# Patient Record
Sex: MALE | Race: WHITE | NOT HISPANIC OR LATINO | URBAN - METROPOLITAN AREA
[De-identification: names, ages, dates, MRNs, and addresses within clinical notes are randomized per-mention and may not be internally consistent; named-entity substitution may affect disease eponyms.]

---

## 2022-01-28 ENCOUNTER — IMPORTED ENCOUNTER (OUTPATIENT)
Dept: URBAN - METROPOLITAN AREA CLINIC 38 | Facility: CLINIC | Age: 52
End: 2022-01-28

## 2022-01-28 PROBLEM — H52.4 PRESBYOPIA: Noted: 2022-01-28

## 2022-07-02 ASSESSMENT — KERATOMETRY
OD_AXISANGLE2_DEGREES: 144
OD_AXISANGLE_DEGREES: 54
OS_K2POWER_DIOPTERS: 43.00
OS_AXISANGLE_DEGREES: 124
OS_K1POWER_DIOPTERS: 42.75
OS_AXISANGLE2_DEGREES: 34
OD_K2POWER_DIOPTERS: 43.50
OD_K1POWER_DIOPTERS: 42.50

## 2022-07-02 ASSESSMENT — TONOMETRY
OD_IOP_MMHG: 11
OS_IOP_MMHG: 14

## 2022-07-02 ASSESSMENT — VISUAL ACUITY
OD_SC: J4
OD_SC: 20/20-1
OS_SC: 20/30
OS_SC: J7

## 2023-10-26 ENCOUNTER — ESTABLISHED COMPREHENSIVE EXAM (OUTPATIENT)
Dept: URBAN - METROPOLITAN AREA CLINIC 68 | Facility: CLINIC | Age: 53
End: 2023-10-26

## 2023-10-26 DIAGNOSIS — H53.2: ICD-10-CM

## 2023-10-26 PROCEDURE — 92015 DETERMINE REFRACTIVE STATE: CPT

## 2023-10-26 PROCEDURE — 92014 COMPRE OPH EXAM EST PT 1/>: CPT

## 2023-10-26 ASSESSMENT — VISUAL ACUITY
OS_CC: J1
OS_SC: 20/40
OS_PH: 20/20
OD_CC: J1
OD_SC: 20/30

## 2023-10-26 ASSESSMENT — KERATOMETRY
OD_AXISANGLE2_DEGREES: 132
OS_AXISANGLE_DEGREES: 121
OD_K1POWER_DIOPTERS: 42.50
OD_AXISANGLE_DEGREES: 42
OD_K2POWER_DIOPTERS: 43.25
OS_K1POWER_DIOPTERS: 42.50
OS_K2POWER_DIOPTERS: 42.75
OS_AXISANGLE2_DEGREES: 31

## 2023-10-26 ASSESSMENT — TONOMETRY
OD_IOP_MMHG: 14
OS_IOP_MMHG: 13

## 2024-08-15 ENCOUNTER — APPOINTMENT (OUTPATIENT)
Dept: RADIOLOGY | Facility: CLINIC | Age: 54
End: 2024-08-15
Payer: COMMERCIAL

## 2024-08-15 ENCOUNTER — OFFICE VISIT (OUTPATIENT)
Dept: URGENT CARE | Facility: CLINIC | Age: 54
End: 2024-08-15
Payer: COMMERCIAL

## 2024-08-15 VITALS
DIASTOLIC BLOOD PRESSURE: 86 MMHG | HEART RATE: 76 BPM | OXYGEN SATURATION: 100 % | RESPIRATION RATE: 14 BRPM | SYSTOLIC BLOOD PRESSURE: 124 MMHG | TEMPERATURE: 96.4 F

## 2024-08-15 DIAGNOSIS — M10.9 ACUTE GOUT OF RIGHT ANKLE, UNSPECIFIED CAUSE: Primary | ICD-10-CM

## 2024-08-15 DIAGNOSIS — M25.571 RIGHT ANKLE PAIN, UNSPECIFIED CHRONICITY: ICD-10-CM

## 2024-08-15 PROCEDURE — 99213 OFFICE O/P EST LOW 20 MIN: CPT | Performed by: PHYSICIAN ASSISTANT

## 2024-08-15 PROCEDURE — 73610 X-RAY EXAM OF ANKLE: CPT

## 2024-08-15 RX ORDER — PREDNISONE 10 MG/1
40 TABLET ORAL DAILY
Qty: 16 TABLET | Refills: 0 | Status: SHIPPED | OUTPATIENT
Start: 2024-08-15 | End: 2024-08-15

## 2024-08-15 RX ORDER — PREDNISONE 10 MG/1
40 TABLET ORAL DAILY
Qty: 16 TABLET | Refills: 0 | Status: SHIPPED | OUTPATIENT
Start: 2024-08-15 | End: 2024-08-19

## 2024-08-15 RX ORDER — CETIRIZINE HYDROCHLORIDE, PSEUDOEPHEDRINE HYDROCHLORIDE 5; 120 MG/1; MG/1
1 TABLET, FILM COATED, EXTENDED RELEASE ORAL 2 TIMES DAILY
COMMUNITY

## 2024-08-15 NOTE — PATIENT INSTRUCTIONS
"Patient Education     Gout   The Basics   Written by the doctors and editors at [facility]   What is gout? -- Gout is a form of arthritis. It can cause pain and swelling in the joints.  Gout happens in people who have too much uric acid in their blood. Uric acid is a chemical that is produced when the body breaks down certain foods. Uric acid can form sharp needle-like crystals that build up in the joints and cause pain. These crystals can also form inside the tubes that carry urine from the kidneys to the bladder. There, they can turn into kidney stones that can cause pain and problems with the flow of urine.  People with gout get sudden \"flares\" or attacks of severe pain. But there are medicines that can help prevent this.  What are the symptoms of gout? -- Gout flares most often cause pain in the big toe, ankle, or knee. Often, the joint also turns red and swells. Usually, only 1 joint is affected, but some people have pain in more than 1 joint. Gout flares tend to happen more often during the night.  The pain from gout can be extreme. The pain and swelling are worst at the beginning of a flare. The symptoms then get better within a few days to weeks. It is not clear how the body \"turns off\" a gout flare.  Is there a test for gout? -- Yes. To test for gout, your doctor or nurse can take a sample of fluid from your joint that is in pain. If they find typical gout crystals in the fluid, then you have gout.  Even without checking fluid from a joint, the doctor or nurse might still strongly suspect gout if:   You have had pain and swelling in 1 joint, especially the joint at the base of the big toe.   Your symptoms completely go away between flares, at least when you first start having them.   Your blood tests show high levels of uric acid.  How is gout treated? -- There are a few medicines that can reduce the pain and swelling caused by gout. When you find one that works for you, have it close by all of the time. That " way, you can take it as soon you feel a flare starting. Gout medicines work best if you take them as soon as symptoms start.  The medicines used to treat gout flares include:   NSAIDs - This is a large group of medicines that includes ibuprofen (sample brand names: Advil, Motrin) and indomethacin (brand name: Indocin). NSAIDs might not be safe for people with kidney or liver disease, or for people who have bleeding problems.   Colchicine - This medicine helps with gout but it can also cause diarrhea, nausea, vomiting, and stomach pain.   Steroids - Steroid medicines can reduce swelling and pain. Steroids can be taken as pills or as shots.  Are there medicines to prevent gout flares? -- Yes, there are medicines that can reduce the chances of having future gout flares. Most people who have repeated or severe flares of gout need to take these medicines. In general, they all work by reducing the amount of uric acid in the blood.  Examples of these medicines include:   Allopurinol (brand names: Aloprim, Zyloprim)   Febuxostat (brand name: Uloric)   Probenecid  People with severe gout can also get a medicine called pegloticase (brand name: Krystexxa), which is given through a vein. This medicine can cause an allergic reaction in some people.  If you take a medicine to prevent gout, your doctor or nurse will want to make sure that you use it safely. They might also want to check that your uric acid level gets low enough to dissolve the gout crystals. Allopurinol, febuxostat, and probenecid can actually increase gout flares when you first start taking them. To prevent these flares, your doctor or nurse might suggest that you take low doses of colchicine when you start the medicines. This will give the gout crystals time to dissolve, and that will stop the flares over time. If you do have a gout flare, it's important to keep taking your daily medicines normally.  Your doctor will order blood tests to check your uric acid  "levels regularly. This is to make sure that the medicines are working and that you are taking the right dose. Keeping your uric acid level below a certain level can help prevent gout flares.  Can I do anything on my own to prevent gout flares? -- Yes. There are some things that might help:   It's not clear that following a specific diet plan will help with gout symptoms. But eating a balanced diet can help improve your overall health. Try to eat plenty of fruits, vegetables, whole grains, and low-fat dairy products. It's also important to drink plenty of water, and try not to get dehydrated.   Limit sugary drinks and alcohol. These can make gout flares worse.   Some people feel better when they limit foods that are high in \"purines.\" (Purines are natural substances found in many foods.) You can ask your doctor if avoiding high-purine foods might help you. But other things, like taking your medicines and avoiding alcohol, are more likely to help with gout symptoms.   If you have excess body weight, losing weight can help relieve gout.   Some people with gout also have other health problems, such as heart disease, high blood pressure, kidney disease, or obesity. If you have any of these issues, work with your doctor to manage them. This can help improve your overall health and might also help with your gout.   When you have a gout flare, you might feel better if you also rest or ice your joint.  If you are having trouble managing your gout, your doctor might refer you to a specialist called a \"rheumatologist.\"  All topics are updated as new evidence becomes available and our peer review process is complete.  This topic retrieved from [employer] on: Mar 30, 2024.  Topic 64528 Version 22.0  Release: 32.2.4 - C32.88  © 2024 UpToDate, Inc. and/or its affiliates. All rights reserved.  Consumer Information Use and Disclaimer   Disclaimer: This generalized information is a limited summary of diagnosis, treatment, and/or " medication information. It is not meant to be comprehensive and should be used as a tool to help the user understand and/or assess potential diagnostic and treatment options. It does NOT include all information about conditions, treatments, medications, side effects, or risks that may apply to a specific patient. It is not intended to be medical advice or a substitute for the medical advice, diagnosis, or treatment of a health care provider based on the health care provider's examination and assessment of a patient's specific and unique circumstances. Patients must speak with a health care provider for complete information about their health, medical questions, and treatment options, including any risks or benefits regarding use of medications. This information does not endorse any treatments or medications as safe, effective, or approved for treating a specific patient. UpToDate, Inc. and its affiliates disclaim any warranty or liability relating to this information or the use thereof.The use of this information is governed by the Terms of Use, available at https://www.[employer].com/en/know/clinical-effectiveness-terms. 2024© UpToDate, Inc. and its affiliates and/or licensors. All rights reserved.  Copyright   © 2024 UpToDate, Inc. and/or its affiliates. All rights reserved.

## 2024-08-15 NOTE — PROGRESS NOTES
"  [facility]        NAME: Jarret Mckinley is a 53 y.o. male  : 1970    MRN: [id number]  DATE: August 15, 2024  TIME: 9:18 AM    Assessment and Plan   Acute gout of right ankle, unspecified cause [M10.9]  1. Acute gout of right ankle, unspecified cause        2. Right ankle pain, unspecified chronicity  XR ankle 3+ vw right    XR ankle 3+ vw right    predniSONE 10 mg tablet    DISCONTINUED: predniSONE 10 mg tablet        Atraumatic severe lateral right foot pain. Wells Score: -2.  Concern for the patient's first episode of gout.  Will treat with prednisone and have him follow-up with his family doctor.    Patient Instructions     Patient Instructions   Patient Education     Gout   The Basics   Written by the doctors and editors at [facility]   What is gout? -- Gout is a form of arthritis. It can cause pain and swelling in the joints.  Gout happens in people who have too much uric acid in their blood. Uric acid is a chemical that is produced when the body breaks down certain foods. Uric acid can form sharp needle-like crystals that build up in the joints and cause pain. These crystals can also form inside the tubes that carry urine from the kidneys to the bladder. There, they can turn into kidney stones that can cause pain and problems with the flow of urine.  People with gout get sudden \"flares\" or attacks of severe pain. But there are medicines that can help prevent this.  What are the symptoms of gout? -- Gout flares most often cause pain in the big toe, ankle, or knee. Often, the joint also turns red and swells. Usually, only 1 joint is affected, but some people have pain in more than 1 joint. Gout flares tend to happen more often during the night.  The pain from gout can be extreme. The pain and swelling are worst at the beginning of a flare. The symptoms then get better within a few days to weeks. It is not clear how the body \"turns off\" a gout flare.  Is there a test for gout? -- Yes. To test for " gout, your doctor or nurse can take a sample of fluid from your joint that is in pain. If they find typical gout crystals in the fluid, then you have gout.  Even without checking fluid from a joint, the doctor or nurse might still strongly suspect gout if:   You have had pain and swelling in 1 joint, especially the joint at the base of the big toe.   Your symptoms completely go away between flares, at least when you first start having them.   Your blood tests show high levels of uric acid.  How is gout treated? -- There are a few medicines that can reduce the pain and swelling caused by gout. When you find one that works for you, have it close by all of the time. That way, you can take it as soon you feel a flare starting. Gout medicines work best if you take them as soon as symptoms start.  The medicines used to treat gout flares include:   NSAIDs - This is a large group of medicines that includes ibuprofen (sample brand names: Advil, Motrin) and indomethacin (brand name: Indocin). NSAIDs might not be safe for people with kidney or liver disease, or for people who have bleeding problems.   Colchicine - This medicine helps with gout but it can also cause diarrhea, nausea, vomiting, and stomach pain.   Steroids - Steroid medicines can reduce swelling and pain. Steroids can be taken as pills or as shots.  Are there medicines to prevent gout flares? -- Yes, there are medicines that can reduce the chances of having future gout flares. Most people who have repeated or severe flares of gout need to take these medicines. In general, they all work by reducing the amount of uric acid in the blood.  Examples of these medicines include:   Allopurinol (brand names: Aloprim, Zyloprim)   Febuxostat (brand name: Uloric)   Probenecid  People with severe gout can also get a medicine called pegloticase (brand name: Krystexxa), which is given through a vein. This medicine can cause an allergic reaction in some people.  If you take a  "medicine to prevent gout, your doctor or nurse will want to make sure that you use it safely. They might also want to check that your uric acid level gets low enough to dissolve the gout crystals. Allopurinol, febuxostat, and probenecid can actually increase gout flares when you first start taking them. To prevent these flares, your doctor or nurse might suggest that you take low doses of colchicine when you start the medicines. This will give the gout crystals time to dissolve, and that will stop the flares over time. If you do have a gout flare, it's important to keep taking your daily medicines normally.  Your doctor will order blood tests to check your uric acid levels regularly. This is to make sure that the medicines are working and that you are taking the right dose. Keeping your uric acid level below a certain level can help prevent gout flares.  Can I do anything on my own to prevent gout flares? -- Yes. There are some things that might help:   It's not clear that following a specific diet plan will help with gout symptoms. But eating a balanced diet can help improve your overall health. Try to eat plenty of fruits, vegetables, whole grains, and low-fat dairy products. It's also important to drink plenty of water, and try not to get dehydrated.   Limit sugary drinks and alcohol. These can make gout flares worse.   Some people feel better when they limit foods that are high in \"purines.\" (Purines are natural substances found in many foods.) You can ask your doctor if avoiding high-purine foods might help you. But other things, like taking your medicines and avoiding alcohol, are more likely to help with gout symptoms.   If you have excess body weight, losing weight can help relieve gout.   Some people with gout also have other health problems, such as heart disease, high blood pressure, kidney disease, or obesity. If you have any of these issues, work with your doctor to manage them. This can help improve " "your overall health and might also help with your gout.   When you have a gout flare, you might feel better if you also rest or ice your joint.  If you are having trouble managing your gout, your doctor might refer you to a specialist called a \"rheumatologist.\"  All topics are updated as new evidence becomes available and our peer review process is complete.  This topic retrieved from [employer] on: Mar 30, 2024.  Topic 81401 Version 22.0  Release: 32.2.4 - C32.88  © 2024 UpToDate, Inc. and/or its affiliates. All rights reserved.  Consumer Information Use and Disclaimer   Disclaimer: This generalized information is a limited summary of diagnosis, treatment, and/or medication information. It is not meant to be comprehensive and should be used as a tool to help the user understand and/or assess potential diagnostic and treatment options. It does NOT include all information about conditions, treatments, medications, side effects, or risks that may apply to a specific patient. It is not intended to be medical advice or a substitute for the medical advice, diagnosis, or treatment of a health care provider based on the health care provider's examination and assessment of a patient's specific and unique circumstances. Patients must speak with a health care provider for complete information about their health, medical questions, and treatment options, including any risks or benefits regarding use of medications. This information does not endorse any treatments or medications as safe, effective, or approved for treating a specific patient. UpToDate, Inc. and its affiliates disclaim any warranty or liability relating to this information or the use thereof.The use of this information is governed by the Terms of Use, available at https://www.[employer].com/en/know/clinical-effectiveness-terms. 2024© UpToDate, Inc. and its affiliates and/or licensors. All rights reserved.  Copyright   © 2024 UpToDate, Inc. and/or its " affiliates. All rights reserved.        Follow up with PCP in 3-5 days.  Proceed to  ER if symptoms worsen.    Chief Complaint     Chief Complaint   Patient presents with    Ankle Pain     Lateral right ankle pain that worsened overnight.  Denies fall, trauma or injury.           History of Present Illness       The patient is a 53-year-old male presenting today for lateral right ankle pain.  He denies any recent falls or injury.  Denies any prior episodes of pain like this.  He admits that while at work he felt some pain in the lateral right ankle.  He left work and went to the gym and by the time he got to his girlfriend's house he had extreme pain in the lateral right ankle causing him to limp.  Admits that the pain got worse overnight.  Currently reports having pain even when putting his socks on.  He feels like he cannot move the ankle due to pain.  He does not smoke cigarettes.  He has not had any recent long travel.  He does not have a history of DVT/PE.  He does not have any significant medical history.  No active cancer.  He has not recently been in a cast or boot.  Has never had gout before.  He does not have any swelling of his calves bilaterally.  He has not had chest pain or shortness of breath.        Review of Systems   Review of Systems   Respiratory:  Negative for chest tightness and shortness of breath.    Cardiovascular:  Negative for chest pain.   Musculoskeletal:  Positive for arthralgias. Negative for joint swelling and myalgias.         Current Medications       Current Outpatient Medications:     predniSONE 10 mg tablet, Take 4 tablets (40 mg total) by mouth daily for 4 days, Disp: 16 tablet, Rfl: 0    cetirizine-pseudoephedrine (ZyrTEC-D) 5-120 MG per tablet, Take 1 tablet by mouth 2 (two) times a day PRN, Disp: , Rfl:     Current Allergies     Allergies as of 08/15/2024 - Reviewed 08/15/2024   Allergen Reaction Noted    [place] oil - food allergy Anaphylaxis 02/05/2020    Anesthetics, amide  Anaphylaxis 03/29/2022    Codeine Anaphylaxis 01/21/2019    Hydrocodone Shortness Of Breath 01/01/2019    Propofol Other (See Comments) 11/01/2023            The following portions of the patient's history were reviewed and updated as appropriate: allergies, current medications, past family history, past medical history, past social history, past surgical history and problem list.     History reviewed. No pertinent past medical history.    History reviewed. No pertinent surgical history.    History reviewed. No pertinent family history.      Medications have been verified.        Objective   /86   Pulse 76   Temp (!) 96.4 °F (35.8 °C)   Resp 14   SpO2 100%        Physical Exam     Physical Exam  Vitals and nursing note reviewed.   Constitutional:       General: He is not in acute distress.     Appearance: Normal appearance. He is normal weight. He is not ill-appearing, toxic-appearing or diaphoretic.   HENT:      Head: Normocephalic and atraumatic.   Cardiovascular:      Rate and Rhythm: Normal rate and regular rhythm.      Heart sounds: Normal heart sounds.   Pulmonary:      Effort: Pulmonary effort is normal.      Breath sounds: Normal breath sounds.   Musculoskeletal:         General: Tenderness present. No swelling.      Cervical back: Normal range of motion.      Comments: Tender to very light palpation of the right lateral malleolus.  No swelling of the right ankle.  Very slight erythema/warmth over the lateral malleolus.  Full range of motion in the toes.  No pain to palpation of the calves bilaterally.  No erythema, swelling or warmth of his calves bilaterally.  No pain to palpation anywhere else on the patient's foot/ankle.   Lymphadenopathy:      Cervical: No cervical adenopathy.   Skin:     General: Skin is warm and dry.      Capillary Refill: Capillary refill takes less than 2 seconds.   Neurological:      Mental Status: He is alert.